# Patient Record
Sex: FEMALE | Race: WHITE | NOT HISPANIC OR LATINO | URBAN - METROPOLITAN AREA
[De-identification: names, ages, dates, MRNs, and addresses within clinical notes are randomized per-mention and may not be internally consistent; named-entity substitution may affect disease eponyms.]

---

## 2019-04-05 ENCOUNTER — IMPORTED ENCOUNTER (OUTPATIENT)
Dept: URBAN - METROPOLITAN AREA CLINIC 1 | Facility: CLINIC | Age: 44
End: 2019-04-05

## 2019-04-05 PROBLEM — H52.13: Noted: 2019-04-05

## 2019-04-05 PROBLEM — H52.4: Noted: 2019-04-05

## 2019-04-05 PROCEDURE — S0620 ROUTINE OPHTHALMOLOGICAL EXA: HCPCS

## 2020-06-02 ENCOUNTER — IMPORTED ENCOUNTER (OUTPATIENT)
Dept: URBAN - METROPOLITAN AREA CLINIC 1 | Facility: CLINIC | Age: 45
End: 2020-06-02

## 2020-06-02 PROBLEM — H52.223: Noted: 2020-06-02

## 2020-06-02 PROBLEM — H52.4: Noted: 2020-06-02

## 2020-06-02 PROBLEM — H52.13: Noted: 2020-06-02

## 2020-06-02 PROCEDURE — S0621 ROUTINE OPHTHALMOLOGICAL EXA: HCPCS

## 2020-06-02 NOTE — PATIENT DISCUSSION
1.  Presbyopia2. Myopia OU3. Astigmatism OU- Rx was given for correction if indicated and requested. 4. Return for an appointment in 1 year for [address] with Dr. Jessica Meneses.

## 2022-04-02 ASSESSMENT — VISUAL ACUITY
OS_CC: 20/25-2
OS_SC: J1
OD_SC: J1
OD_CC: 20/25-2
OS_CC: 20/25
OD_CC: 20/30

## 2022-04-02 ASSESSMENT — TONOMETRY
OD_IOP_MMHG: 16
OS_IOP_MMHG: 16
OD_IOP_MMHG: 15
OS_IOP_MMHG: 17